# Patient Record
Sex: FEMALE | URBAN - METROPOLITAN AREA
[De-identification: names, ages, dates, MRNs, and addresses within clinical notes are randomized per-mention and may not be internally consistent; named-entity substitution may affect disease eponyms.]

---

## 2024-08-08 ENCOUNTER — OFFICE VISIT (OUTPATIENT)
Dept: URBAN - METROPOLITAN AREA CLINIC 80 | Facility: CLINIC | Age: 44
End: 2024-08-08

## 2024-08-27 ENCOUNTER — OFFICE VISIT (OUTPATIENT)
Dept: URBAN - METROPOLITAN AREA CLINIC 80 | Facility: CLINIC | Age: 44
End: 2024-08-27

## 2024-09-10 ENCOUNTER — OFFICE VISIT (OUTPATIENT)
Dept: URBAN - METROPOLITAN AREA CLINIC 80 | Facility: CLINIC | Age: 44
End: 2024-09-10

## 2024-09-10 NOTE — HPI-TODAY'S VISIT:
43 yo wants to get to the bottom of it  "gastritis" self diagnosis Years ago [facility] Seemed to be doing fine then started having reflux Handeled that  ?sinus draiinage, took flonase which seemed to help  Episodes:  can manage: stopping coffee, caffeine, EtOH of any kind  Constipated, then starts the reflux.  Has never seemed to have daily BM> Bowels don't work that way Then has huge major gas/pressure  This year resorted to gasX and took for several days.